# Patient Record
Sex: MALE
[De-identification: names, ages, dates, MRNs, and addresses within clinical notes are randomized per-mention and may not be internally consistent; named-entity substitution may affect disease eponyms.]

---

## 2019-01-01 ENCOUNTER — HOSPITAL ENCOUNTER (EMERGENCY)
Dept: HOSPITAL 14 - H.ER | Age: 39
Discharge: HOME | End: 2019-01-01
Payer: COMMERCIAL

## 2019-01-01 VITALS — DIASTOLIC BLOOD PRESSURE: 88 MMHG | HEART RATE: 78 BPM | SYSTOLIC BLOOD PRESSURE: 135 MMHG

## 2019-01-01 VITALS — RESPIRATION RATE: 18 BRPM | TEMPERATURE: 98.7 F | OXYGEN SATURATION: 100 %

## 2019-01-01 DIAGNOSIS — S01.01XA: ICD-10-CM

## 2019-01-01 DIAGNOSIS — E04.1: ICD-10-CM

## 2019-01-01 DIAGNOSIS — Z23: ICD-10-CM
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
## 2019-01-01 DIAGNOSIS — R55: Primary | ICD-10-CM

## 2019-01-01 DIAGNOSIS — D35.4: ICD-10-CM

## 2019-01-01 LAB
ALBUMIN SERPL-MCNC: 4.2 G/DL (ref 3.5–5)
ALBUMIN/GLOB SERPL: 1.5 {RATIO} (ref 1–2.1)
ALT SERPL-CCNC: 47 U/L (ref 21–72)
AST SERPL-CCNC: 42 U/L (ref 17–59)
BASE EXCESS BLDV CALC-SCNC: 5.8 MMOL/L (ref 0–2)
BASOPHILS # BLD AUTO: 0 K/UL (ref 0–0.2)
BASOPHILS NFR BLD: 0.5 % (ref 0–2)
BILIRUB UR-MCNC: NEGATIVE MG/DL
BUN SERPL-MCNC: 19 MG/DL (ref 9–20)
CALCIUM SERPL-MCNC: 9.3 MG/DL (ref 8.4–10.2)
COLOR UR: YELLOW
EOSINOPHIL # BLD AUTO: 0.2 K/UL (ref 0–0.7)
EOSINOPHIL NFR BLD: 2.8 % (ref 0–4)
ERYTHROCYTE [DISTWIDTH] IN BLOOD BY AUTOMATED COUNT: 12.9 % (ref 11.5–14.5)
GFR NON-AFRICAN AMERICAN: > 60
GLUCOSE UR STRIP-MCNC: (no result) MG/DL
HGB BLD-MCNC: 13.8 G/DL (ref 12–18)
LEUKOCYTE ESTERASE UR-ACNC: (no result) LEU/UL
LYMPHOCYTES # BLD AUTO: 2.1 K/UL (ref 1–4.3)
LYMPHOCYTES NFR BLD AUTO: 25.9 % (ref 20–40)
MCH RBC QN AUTO: 31.9 PG (ref 27–31)
MCHC RBC AUTO-ENTMCNC: 32.4 G/DL (ref 33–37)
MCV RBC AUTO: 98.7 FL (ref 80–94)
MONOCYTES # BLD: 0.7 K/UL (ref 0–0.8)
MONOCYTES NFR BLD: 8.7 % (ref 0–10)
NEUTROPHILS # BLD: 4.9 K/UL (ref 1.8–7)
NEUTROPHILS NFR BLD AUTO: 62.1 % (ref 50–75)
NRBC BLD AUTO-RTO: 0.2 % (ref 0–0)
PCO2 BLDV: 55 MMHG (ref 40–60)
PH BLDV: 7.38 [PH] (ref 7.32–7.43)
PH UR STRIP: 6 [PH] (ref 5–8)
PLATELET # BLD: 252 K/UL (ref 130–400)
PMV BLD AUTO: 7.8 FL (ref 7.2–11.7)
PROT UR STRIP-MCNC: NEGATIVE MG/DL
RBC # BLD AUTO: 4.33 MIL/UL (ref 4.4–5.9)
RBC # UR STRIP: NEGATIVE /UL
SP GR UR STRIP: 1.01 (ref 1–1.03)
SQUAMOUS EPITHIAL: < 1 /HPF (ref 0–5)
URINE CLARITY: (no result)
URINE HYALINE CAST: (no result) /HPF (ref 0–2)
UROBILINOGEN UR-MCNC: (no result) MG/DL (ref 0.2–1)
VENOUS BLOOD FIO2: 21 %
VENOUS BLOOD GAS PO2: 31 MM/HG (ref 30–55)
WBC # BLD AUTO: 8 K/UL (ref 4.8–10.8)

## 2019-01-01 PROCEDURE — 80346 BENZODIAZEPINES1-12: CPT

## 2019-01-01 PROCEDURE — 83992 ASSAY FOR PHENCYCLIDINE: CPT

## 2019-01-01 PROCEDURE — 82803 BLOOD GASES ANY COMBINATION: CPT

## 2019-01-01 PROCEDURE — 90715 TDAP VACCINE 7 YRS/> IM: CPT

## 2019-01-01 PROCEDURE — 80349 CANNABINOIDS NATURAL: CPT

## 2019-01-01 PROCEDURE — 80361 OPIATES 1 OR MORE: CPT

## 2019-01-01 PROCEDURE — 80353 DRUG SCREENING COCAINE: CPT

## 2019-01-01 PROCEDURE — 80358 DRUG SCREENING METHADONE: CPT

## 2019-01-01 PROCEDURE — 80053 COMPREHEN METABOLIC PANEL: CPT

## 2019-01-01 PROCEDURE — 71250 CT THORAX DX C-: CPT

## 2019-01-01 PROCEDURE — 80345 DRUG SCREENING BARBITURATES: CPT

## 2019-01-01 PROCEDURE — 96374 THER/PROPH/DIAG INJ IV PUSH: CPT

## 2019-01-01 PROCEDURE — 90471 IMMUNIZATION ADMIN: CPT

## 2019-01-01 PROCEDURE — 70450 CT HEAD/BRAIN W/O DYE: CPT

## 2019-01-01 PROCEDURE — 81003 URINALYSIS AUTO W/O SCOPE: CPT

## 2019-01-01 PROCEDURE — 85025 COMPLETE CBC W/AUTO DIFF WBC: CPT

## 2019-01-01 PROCEDURE — 12001 RPR S/N/AX/GEN/TRNK 2.5CM/<: CPT

## 2019-01-01 PROCEDURE — 99285 EMERGENCY DEPT VISIT HI MDM: CPT

## 2019-01-01 PROCEDURE — 72125 CT NECK SPINE W/O DYE: CPT

## 2019-01-01 PROCEDURE — 80320 DRUG SCREEN QUANTALCOHOLS: CPT

## 2019-01-01 PROCEDURE — 93005 ELECTROCARDIOGRAM TRACING: CPT

## 2019-01-01 PROCEDURE — 80324 DRUG SCREEN AMPHETAMINES 1/2: CPT

## 2019-01-01 PROCEDURE — 82948 REAGENT STRIP/BLOOD GLUCOSE: CPT

## 2019-01-01 NOTE — CT
Date of service: 



01/01/2019



PROCEDURE:  CT Cervical Spine without contrast



HISTORY:

Trauma 



COMPARISON:

None available.



TECHNIQUE:

Axial computed tomography images were obtained of the cervical spine 

without the use of intravenous contrast. Coronal and sagittal 

reformatted images were created and reviewed.



Radiation dose:



Total exam DLP = 365.77 mGy-cm.



This CT exam was performed using one or more of the following dose 

reduction techniques: Automated exposure control, adjustment of the 

mA and/or kV according to patient size, and/or use of iterative 

reconstruction technique.



FINDINGS:



VERTEBRAE:

No fracture. Normal alignment. No destructive bony lesion.



DISCS/SPINAL CANAL/NEURAL FORAMINA:

No significant central canal or neural foraminal stenosis. Discs 

heights are grossly preserved.



PARASPINAL SOFT TISSUES:

Unremarkable. 



OTHER FINDINGS:

There is a thin curvilinear density within the upper trachea that 

probably represents some adherent mucous stranding. 



There appears to be a vague area low attenuation left lobe thyroid 

gland associate with a small calcification.  Small calcification 

right lobe thyroid gland; follow-up thyroid ultrasound recommended.. 



Note made of few tiny tonsil the right palatine tonsil



IMPRESSION:

No evidence of acute displaced-compression fractures nor retropulsed 

fragments. 



Low-attenuation lesion associate with small calcification left lobe 

thyroid gland felt be present.  There also calcification right lobe.  

Follow-up thyroid ultrasound recommended.

## 2019-01-01 NOTE — CT
Date of service: 



01/01/2019



PROCEDURE:  CT Chest without contrast



HISTORY:

Trauma 



COMPARISON:

No prior



TECHNIQUE:

Contiguous axial images were obtained through the chest without 

intravenous contrast enhancement. Sagittal and coronal 

reconstructions were performed.







Radiation dose:



Total exam DLP = 404.33 mGy-cm.



This CT exam was performed using one or more of the following dose 

reduction techniques: Automated exposure control, adjustment of the 

mA and/or kV according to patient size, and/or use of iterative 

reconstruction technique.



FINDINGS:



LUNGS:

 Minimal passive/dependent type atelectasis seen both posterior lower 

lung fields.  The the 



MEDIASTINUM:

Unremarkable thoracic aorta. No aneurysm. Normal sized heart. Main 

pulmonary artery unremarkable. No vascular congestion. No 

lymphadenopathy.



No  aortic atherosclerotic calcification.



PLEURA:

No pleural fluid. No pneumothorax.



BONES:

No fracture. No destructive lesion. 



UPPER ABDOMEN:

Grossly unremarkable.



OTHER FINDINGS:

 None.



IMPRESSION:

Minimal passive atelectasis both posterior lower lung fields.  No 

evidence of acute intra thoracic posttraumatic sequela.

## 2019-01-01 NOTE — ED PDOC
Syncope/Near Syncope/Dizziness


Time Seen by Provider: 01/01/19 10:22


Chief Complaint (Nursing): Syncope


History Per: Patient


Additional Complaint(s): 





Pt. presents with wife and states earlier today he was walked to the kitchen to 

have a glass of water, felt nauseous, then lost consciousness. As per his wife 

she heard him fall and she immediately went to the kitchen and saw him lying on 

his side unconscious. She tried to wake him up but then pt. "stiffened up" which

lasted for approximately 30 seconds and pt. regained consciousness spontaneously

and was talking to her. They called an ambulance and by the time the ambulance 

arrived to their apartment pt. was fine and sitting down but then felt nauseous 

again and fell to the floor and lost consciousness again. Reports pt. did 

"stiffen" once again and regained consciousness spontaneously. Reports no hx of 

seizure disorder. Currently c/o occipital headache, neck pain, and L sided upper

back pain. Denies oral injury, incontinence, chest pain, SOB, extremity pain, 

numbness, tingling, vomiting, abdominal pain. 





Past Medical History


Reviewed: Historical Data, Nursing Documentation, Vital Signs





- Medical History


PMH: 


   Denies: Seizures





- Surgical History


Surgical History: No Surg Hx





- Family History


Family History: States: No Known Family Hx





- Allergies


Allergies/Adverse Reactions: 


                                    Allergies











Allergy/AdvReac Type Severity Reaction Status Date / Time


 


No Known Allergies Allergy   Verified 01/01/19 10:44














Review of Systems


ROS Statement: Except As Marked, All Systems Reviewed And Found Negative


Gastrointestinal: Positive for: Nausea


Musculoskeletal: Positive for: Back Pain


Neurological: Positive for: Altered Mental Status, Headache





Physical Exam





- Physical Exam


Appears: Positive for: Well, Non-toxic, No Acute Distress


Head Exam: Negative for: ATRAUMATIC (R occipital scalp with 0.5cm superficial 

linear laceration without active bleeding), NORMAL INSPECTION, NORMOCEPHALIC


Eye Exam: Positive for: Normal appearance, EOMI, PERRL.  Negative for: 

Periorbital swelling, Periorbital tenderness


ENT: Positive for: Normal ENT Inspection, TM Is/Are (no hemotympanum b/l), Other

(no oral injury)


Neck: Positive for: Normal, Painless ROM


Cardiovascular/Chest: Positive for: Regular Rate, Rhythm.  Negative for: Chest 

Non Tender (L sided axillary chest wall tenderness)


Respiratory: Positive for: Normal Breath Sounds, Other (no flail chest).  

Negative for: Respiratory Distress


Gastrointestinal/Abdominal: Positive for: Normal Exam, Soft, Other (no ecc

hymosis).  Negative for: Tenderness


Back: Positive for: Normal Inspection.  Negative for: L CVA Tenderness, R CVA 

Tenderness, Vertebral Tenderness (no c-spine tenderness)


Extremity: Positive for: Normal ROM


Neurologic/Psych: Positive for: Alert, Oriented (x3).  Negative for: Aphasia, 

Facial Droop





- Laboratory Results


Result Diagrams: 


                                 01/01/19 11:00





                                 01/01/19 11:00





- ECG


ECG: Positive for: Interpreted By Me


ECG Rhythm: Positive for: Sinus Rhythm.  Negative for: ST/T Changes





- Progress


ED Course And Treament: 





Labs, EKG, CT head, c-spine, chest w/o contrast ordered.


Pt. placed on cardiac monitor. Tetanus prophylaxis administered. 


On re-evalaution, pt. in no distress. Reports feeling much better. 


Informed of all results including CT results. Pt. reports no hx of previous 

thyroid disease or cysts in brain.


Advised to f/u with [facility] or Dr. Melo for further evaluation. Family and patient 

verbalized correct understanding of necessary f/u and care. Advised to return to

ED immediately for any concerns. 





Procedures





- Time-Out


Type of Procedure: laceration repair


Site of Procedure: scalp





- Laceration/Wound Repair


  ** laceration 


Wound Length (cm): 0.5


Wound's Depth, Shape: superficial, linear


Irrigated w/ Saline (ccs): 100


Wound Repaired With: Staples (1)


Wound Complexity: Simple





Disposition





- Clinical Impression


Clinical Impression: 


 Syncope, Thyroid nodule, Pineal gland cyst, Scalp laceration








- Patient ED Disposition


Is Patient to be Admitted: No





- Disposition


Referrals: 


Jailyn Melo MD [Medical Doctor] - 


Sage Carrion MD [Staff Provider] - 


Disposition: Routine/Home


Disposition Time: 13:07


Condition: IMPROVED


Additional Instructions: 


STAPLE REMOVAL IN 5 DAYS


FOLLOW UP WITH DR. CARRION (INTERNIST) AND DR. MELO (NEUROLOGIST) FOR FURTHER 

EVALUATION


RETURN TO ED IMMEDIATELY IF SYMPTOMS WORSEN





RASHAUN LEE, thank you for letting us take care of you today. Your provider

was Ed Mooney MD and you were treated for POSS SYNCOPE. The emergency medical

care you received today was directed at your acute symptoms. If you were 

prescribed any medication, please fill it and take as directed. It may take 

several days for your symptoms to resolve. Return to the Emergency Department if

your symptoms worsen, do not improve, or if you have any other problems.





Please contact your doctor or call one of the physicians/clinics you have been 

referred to that are listed on the Patient Visit Information form that is 

included in your discharge packet. Bring any paperwork you were given at 

discharge with you along with any medications you are taking to your follow up 

visit. Our treatment cannot replace ongoing medical care by a primary care 

provider outside of the emergency department.





Thank you for allowing the [facility] team to be part of your care today.








If you had an X-Ray or CT scan: A Radiologist will review the ED reading if any 

change in treatment is needed we will contact you.***





If you had a blood, urine, or wound culture: It will take several days for the 

results, if any change in treatment is needed we will contact you.***





If you had an STI test: It will take 48 hours for the results. Please call after

1 week if you have not heard back.***


Instructions:  Laceration Repair With [place] (DC), Syncope (Fainting) (DC), 

Thyroid Nodules, Cysts in the Brain


Forms:  [employer] (English)


Print Language: ENGLISH

## 2019-01-01 NOTE — CT
Date of service: 



01/01/2019



PROCEDURE:  CT HEAD WITHOUT CONTRAST.



HISTORY:

Trauma 



COMPARISON:

None available.



TECHNIQUE:

Axial computed tomography images were obtained through the head/brain 

without intravenous contrast.  



Radiation dose:



Total exam DLP = 813.98 mGy-cm.



This CT exam was performed using one or more of the following dose 

reduction techniques: Automated exposure control, adjustment of the 

mA and/or kV according to patient size, and/or use of iterative 

reconstruction technique.



FINDINGS:



HEMORRHAGE:

No intracranial hemorrhage. 



BRAIN:

No mass effect or edema.  No atrophy or chronic microvascular 

ischemic changes. Tiny peripheral calcifications seen surrounding an 

approximately 11.8 x 10.2 mm pineal gland cyst. Pineal gland cyst. 



VENTRICLES:

Unremarkable. No hydrocephalus. 



CALVARIUM:

Unremarkable.



PARANASAL SINUSES:

Unremarkable as visualized. No significant inflammatory changes.



MASTOID AIR CELLS:

Unremarkable as visualized. No inflammatory changes.



OTHER FINDINGS:

None.



IMPRESSION:

No acute intracranial hemorrhage. Small pineal gland cyst

## 2019-01-02 NOTE — CARD
--------------- APPROVED REPORT --------------





Date of service: 01/01/2019



EKG Measurement

Heart [id number]

[id number]

[id number]

[id number]

[id number]



<Conclusion>

Normal sinus rhythm

Normal ECG